# Patient Record
Sex: FEMALE | Race: WHITE | NOT HISPANIC OR LATINO | URBAN - METROPOLITAN AREA
[De-identification: names, ages, dates, MRNs, and addresses within clinical notes are randomized per-mention and may not be internally consistent; named-entity substitution may affect disease eponyms.]

---
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
## 2019-08-15 ENCOUNTER — OFFICE VISIT - RIVER FALLS (OUTPATIENT)
Dept: FAMILY MEDICINE | Facility: CLINIC | Age: 19
End: 2019-08-15

## 2019-08-15 ENCOUNTER — COMMUNICATION - RIVER FALLS (OUTPATIENT)
Dept: FAMILY MEDICINE | Facility: CLINIC | Age: 19
End: 2019-08-15

## 2019-08-15 LAB
CLUE CELLS: PRESENT
TRICHOMONAS, WET PREP: NORMAL
YEAST, WET PREP: NORMAL

## 2019-08-15 ASSESSMENT — MIFFLIN-ST. JEOR: SCORE: 1399.31

## 2019-08-16 ENCOUNTER — COMMUNICATION - RIVER FALLS (OUTPATIENT)
Dept: FAMILY MEDICINE | Facility: CLINIC | Age: 19
End: 2019-08-16

## 2019-08-16 LAB
CHLAMYDIA TRACHOMATIS RNA, TMA - QUEST: NOT DETECTED
NEISSERIA GONORRHOEAE RNA TMA: NOT DETECTED

## 2022-02-11 VITALS
DIASTOLIC BLOOD PRESSURE: 60 MMHG | TEMPERATURE: 97.8 F | WEIGHT: 141.8 LBS | HEART RATE: 72 BPM | BODY MASS INDEX: 23.63 KG/M2 | HEIGHT: 65 IN | SYSTOLIC BLOOD PRESSURE: 128 MMHG

## 2022-02-11 VITALS
WEIGHT: 143.8 LBS | DIASTOLIC BLOOD PRESSURE: 68 MMHG | BODY MASS INDEX: 23.96 KG/M2 | HEIGHT: 65 IN | SYSTOLIC BLOOD PRESSURE: 108 MMHG | HEART RATE: 60 BPM | TEMPERATURE: 98.4 F

## 2022-02-11 VITALS
HEART RATE: 84 BPM | DIASTOLIC BLOOD PRESSURE: 74 MMHG | TEMPERATURE: 97.8 F | WEIGHT: 130.2 LBS | HEIGHT: 66 IN | OXYGEN SATURATION: 98 % | BODY MASS INDEX: 20.93 KG/M2 | SYSTOLIC BLOOD PRESSURE: 106 MMHG

## 2022-02-11 VITALS
DIASTOLIC BLOOD PRESSURE: 70 MMHG | HEART RATE: 88 BPM | TEMPERATURE: 98.2 F | HEIGHT: 66 IN | WEIGHT: 139 LBS | BODY MASS INDEX: 22.34 KG/M2 | OXYGEN SATURATION: 97 % | SYSTOLIC BLOOD PRESSURE: 120 MMHG

## 2022-02-16 NOTE — NURSING NOTE
Depression Screening Entered On:  8/19/2019 10:18 AM CDT    Performed On:  8/15/2019 10:18 AM CDT by Marita Trivedi               Depression Screening   Little Interest - Pleasure in Activities :   Not at all   Feeling Down, Depressed, Hopeless :   Several days   Initial Depression Screen Score :   1    Trouble Falling or Staying Asleep :   Not at all   Feeling Tired or Little Energy :   Several days   Poor Appetite or Overeating :   Several days   Feeling Bad About Yourself :   Several days   Trouble Concentrating :   Not at all   Moving or Speaking Slowly :   Not at all   Thoughts Better Off Dead or Hurting Self :   Not at all   Detailed Depression Screen Score :   3    Total Depression Screen Score :   4    SARA Difficulty with Work, Home, Others :   Not difficult at all   Marita Trivedi - 8/19/2019 10:18 AM CDT

## 2022-02-16 NOTE — TELEPHONE ENCOUNTER
Entered by Yang Aranda CMA on August 20, 2020 10:48:54 AM CDT  ---------------------  From: Yang Aranda CMA   To: [facility]    Sent: 8/20/2020 10:48:54 AM CDT  Subject: Medication Management     ** Submitted: **  Complete:ethinyl estradiol-etonogestrel (ethinyl estradiol-etonogestrel 0.015 mg-0.120 mg/24 hours vaginal ring)   Signed by Yang Aranda CMA  8/20/2020 3:48:00 PM [facility]    ** Approved with modifications: **  ethinyl estradiol-etonogestrel (etonogestrel 0.12 mg-ethinyl estradiol 0.015 mg/24 hr vaginal ring)  insert ONE ring vaginally every FOUR WEEKS; REMOVE AFTER 21 DAYS discard wait ONE WEEK AND place new ring.  Qty:  1 ring        Days Supply:  28        Refills:  0          Substitutions Allowed     Route To Pharmacy - [facility]   Note to Pharmacy:  Pt due for annual exam for further refills  Signed by Yang Aranda CMA            ------------------------------------------  From: [facility]  To: Anne Marie De La Torre MD  Sent: August 20, 2020 7:27:27 AM CDT  Subject: Medication Management  Due: August 12, 2020 4:14:54 PM CDT     ** On Hold Pending Signature **     Dispensed Drug: ethinyl estradiol-etonogestrel (ethinyl estradiol-etonogestrel 0.015 mg-0.120 mg/24 hours vaginal ring), insert ONE ring vaginally every FOUR WEEKS; REMOVE AFTER 21 DAYS discard wait ONE WEEK AND place new ring.  Quantity: 1 ring  Days Supply: 28  Refills: 1  Substitutions Allowed  Notes from Pharmacy:  ------------------------------------------Med Refill      Date of last office visit and reason:  8-15-19 Px      Date of last Med Check / Px:     Date of last labs pertaining to med:      Note:  Rx filled per protocol.  Yang Aranda CMa    RTC order in chart:  yes    For Protocol refill, has patient been contacted:  _

## 2022-02-16 NOTE — TELEPHONE ENCOUNTER
---------------------  From: Ivanna Mustafa LPN (Phone Messages Pool ([id number]_[facility]))   To: [facility] Message Pool ([id number]_[place] [facility] [place]);     Sent: 8/16/2019 8:05:44 AM CDT  Subject: CONSUMER MESSAGE FW: change in prescription           ---------------------  From: CHAPITO SHETTY  To: [facility]  Sent: 08/15/2019 09:50 p.m. CDT  Subject: change in prescription  Hello! Earlier today I received a prescription for metroNIDAZOLE 0.75% vaginal gel to treat a yeast infection, however, I am hoping to find a less expensive treatment option. Initially, I was offered the treatment in pill form and I am wondering if the prescription could be changed to that if it is less expensive? Or are there any other generic forms of the gel?    Thank you for your time!Spoke to pharmacist at 0921. Pill is much less expensive than gel. No other alternatives or generic forms of gel. [facility] for patient at 0922 lettering her know I sent knew script for the pill.

## 2022-02-16 NOTE — NURSING NOTE
Comprehensive Intake Entered On:  8/15/2019 2:56 PM CDT    Performed On:  8/15/2019 2:49 PM CDT by Jemma Mcduffie CMA               Summary   Chief Complaint :   Physical. Possible yeast infection, did use OTC products.    Last Menstrual Period :   8/8/2019 CDT   Weight Measured :   139.0 lb(Converted to: 139 lb 0 oz, 63.05 kg)    Height Measured :   65.5 in(Converted to: 5 ft 5 in, 166.37 cm)    Body Mass Index :   22.78 kg/m2   Body Surface Area :   1.71 m2   Systolic Blood Pressure :   120 mmHg   Diastolic Blood Pressure :   70 mmHg   Mean Arterial Pressure :   87 mmHg   Peripheral Pulse Rate :   88 bpm   BP Site :   Right arm   BP Method :   Manual   HR Method :   Electronic   Temperature Tympanic :   98.2 DegF(Converted to: 36.8 DegC)    Oxygen Saturation :   97 %   Jemma Mcduffie CMA - 8/15/2019 2:49 PM CDT   Health Status   Allergies Verified? :   Yes   Medication History Verified? :   Yes   Pre-Visit Planning Status :   Completed   Tobacco Use? :   Never smoker   Jemma Mcduffie CMA - 8/15/2019 2:49 PM CDT   Consents   Consent for Immunization Exchange :   Consent Granted   Consent for Immunizations to Providers :   Consent Granted   Jemma Mcduffie CMA - 8/15/2019 2:49 PM CDT   Meds / Allergies   (As Of: 8/15/2019 2:56:36 PM CDT)   Allergies (Active)   No Known Medication Allergies  Estimated Onset Date:   Unspecified ; Created By:   Jessica Kauffman CMA; Reaction Status:   Active ; Category:   Drug ; Substance:   No Known Medication Allergies ; Type:   Allergy ; Updated By:   Jessica Kauffman CMA; Reviewed Date:   6/27/2018 12:36 PM CDT        Medication List   (As Of: 8/15/2019 2:56:36 PM CDT)   Prescription/Discharge Order    desogestrel-ethinyl estradiol  :   desogestrel-ethinyl estradiol ; Status:   Prescribed ; Ordered As Mnemonic:   Isibloom 0.15 mg-0.03 mg oral tablet ; Simple Display Line:   1 tab(s), po, daily, 84 tab(s), 3 Refill(s) ; Ordering Provider:   Anne Marie De La Torre MD; Catalog Code:    desogestrel-ethinyl estradiol ; Order Dt/Tm:   6/27/2018 12:53:27 PM

## 2022-02-16 NOTE — PROGRESS NOTES
Chief Complaint    Patient presents for an annual physical exam and vaccines for college.  History of Present Illness      Pt here today for annual exam      she will be starting school this fall and wants to m cole sure her immunizations are up to date.  She does not have any forms with her today.  I was able to find a form for [employer] on line, we reviewed this.  We also reviewed her immunizations from [facility], and pt has required immunizations.      she is on an OCP and tolerating it well, would like this refilled      Review of systems is negative except as per HPI including no fevers, chills, sore throat, runny nose, nausea, vomiting, constipation, diarrhea, rash or new skin lesions, chest pain, palpitations, slurred speech, new paresthesia, shortness of breath or wheeze.             Exam:      see vitals listed below      General: alert and oriented ×3 no acute distress.      HEENT: Normocephalic and atraumatic.       Eyes pupils are equal round and reactive to light extraocular motion is intact. normal conjunctiva      Hearing is grossly normal and there is no otorrhea. Tympanic membranes are pearly grey with a normal light reflex.      Nares are patent there is no rhinorrhea.       Mucous membranes are moist and pink.      Chest: has bilateral rise with no increased work of breathing. clear to auscultation without wheezes, rhonchi, or rales.      Cardiovascular: normal perfusion and brisk capillary refill. S1S2 with regular rate and rhythm and no murmurs, gallops or rubs.      Musculoskeletal: no gross focal abnormalities and normal gait.      Neuro: no gross focal abnormalities and memory seems intact.  CN 2-12 are grossly intact.      Psychiatric: speech is clear and coherent and fluent. Patient dressed appropriately for the weather. Mood is appropriate and affect is full.      Discussed:      using sunscreen, protecting from sunburn,      taking folic acid 400 mcg daily      refer to usdamyhealthyplate.gov, AHA  and ADA for diet and exercise recommendations      consume [id number] mg calcium daily      std screening      regular self skin checks         Physical Exam   Vitals & Measurements    T: 97.8   F (Tympanic)  HR: 84(Peripheral)  BP: 106/74  SpO2: 98%     HT: 65.75 in  WT: 130.2 lb  BMI: 21.17   Assessment/Plan       Screen for STD (sexually transmitted disease) (Z11.3)         Orders:          07911 periodic preventive med est patient 18-39 yrs (Charge), Quantity: 1, Well adult exam  Screen for STD (sexually transmitted disease)          Chlamydia/Neisseria gonorrhoeae RNA, TMA* ([facility]), Specimen Type: Urine, Collection Date: 06/27/18 12:53:00 CDT                Well adult exam (Z00.00)         Orders:          78735 periodic preventive med est patient 18-39 yrs (Charge), Quantity: 1, Well adult exam  Screen for STD (sexually transmitted disease)                Orders:         desogestrel-ethinyl estradiol, 1 tab(s), po, daily, # 84 tab(s), 3 Refill(s), Type: Maintenance, Pharmacy: [facility], Appt due for additional refills. Letter sent, 1 tab(s) po daily, (Ordered)         desogestrel-ethinyl estradiol, 1 tab(s), po, daily, # 28 tab(s), 0 Refill(s), Type: Hard Stop, Pharmacy: [facility], Appt due for additional refills. Letter sent, (Completed)  Patient Information     Name:CHAPITO SHETTY      Address:      [address] [place] [id number]     Sex:Female     YOB: 2000     [phone]     Emergency Contact:MICHELE ALLEN     MRN:[id number]     FIN:[id number]     [facility]     Date of Service:06/27/2018      Primary Care Physician:       NONE ,       Attending Physician:       Anne Marie De La Torre MD, [phone]  Problem List/Past Medical History    Ongoing     No qualifying data    Historical     Distal Radius Fracture, right     No previous hospitalizations  Procedure/Surgical History     No previous  procedures        Medications     Isibloom 0.15 mg-0.03 mg oral tablet: 1 tab(s), po, daily, 84 tab(s), 3 Refill(s).          Allergies    No Known Medication Allergies  Social History    Smoking Status - 06/27/2018     Never smoker  Family History    Breast cancer: Grandmother (M).  Immunizations      Vaccine Date Status Comments      influenza virus vaccine, inactivated 10/05/2017 Given      human papillomavirus vaccine 03/06/2017 Given      human papillomavirus vaccine 09/30/2016 Given [9/30/2016] left lower      meningococcal conjugate vaccine 09/30/2016 Given [9/30/2016] left upper      influenza virus vaccine, inactivated 09/30/2016 Given      rabies 05/17/2016 Given      human papillomavirus vaccine 05/03/2016 Given [5/3/2016] lower      rabies 05/03/2016 Given [5/3/2016] upper      rabies 04/26/2016 Given      Hep A, pediatric/adolescent 02/10/2016 Given      typhoid, inactivated 02/10/2016 Given      yellow fever 02/10/2016 Given      influenza (LAIV) 10/19/2015 Given      influenza (LAIV) 10/14/2014 Given      influenza (LAIV) 10/16/2013 Given      influenza virus vaccine, inactivated 09/26/2012 Given      Hep A, pediatric/adolescent 08/15/2012 Given      meningococcal conjugate vaccine 09/28/2011 Given      tetanus/diphth/pertuss (Tdap) adult/adol 09/28/2011 Given      influenza virus vaccine, inactivated 10/11/2010 Given      influenza 12/18/2009 Recorded      influenza, H1N1, inactivated 12/18/2009 Recorded      influenza virus vaccine, inactivated 11/12/2008 Recorded      varicella 06/26/2008 Recorded      influenza 11/19/2007 Recorded      influenza virus vaccine, inactivated 11/19/2007 Recorded      influenza virus vaccine, inactivated 10/22/2007 Recorded      influenza virus vaccine, inactivated 10/22/2007 Recorded      influenza virus vaccine, inactivated 02/13/2007 Recorded      IPV 12/04/2003 Recorded      MMR (measles/mumps/rubella) 12/04/2003 Recorded      DTaP 12/04/2003 Recorded      Hib  (PRP-T) 02/12/2001 Recorded      IPV 02/12/2001 Recorded      Hep B 02/12/2001 Recorded      DTaP 02/12/2001 Recorded      MMR (measles/mumps/rubella) 10/30/2000 Recorded      varicella 10/30/2000 Recorded      pneumococcal (PCV7) 10/30/2000 Recorded      pneumococcal (PCV7) 08/01/2000 Recorded      Hib (PRP-T) 05/15/2000 Recorded      Hep B 05/15/2000 Recorded      pneumococcal (PCV7) 05/15/2000 Recorded      DTaP 05/15/2000 Recorded      Hib (PRP-T) 03/13/2000 Recorded      IPV 03/13/2000 Recorded      Hep B 03/13/2000 Recorded      DTaP 03/13/2000 Recorded      Hib (PRP-T) 01/04/2000 Recorded      IPV 01/04/2000 Recorded      DTaP 01/04/2000 Recorded

## 2022-02-16 NOTE — LETTER
(Inserted Image. Unable to display)       August 16, 2019      CHAPITO SHETTY  [address], [place] [id number]        Dear CHAPITO,     Thank you for selecting [facility] for your healthcare needs. Below you will find the results of your recent test(s) done at our clinic.     Your results are normal!  Please come back for a follow up appointment if you are still having symptoms.       Result Name Current Result Previous Result Reference Range   Chlam/N. gonorrhea Comments  See comment 8/15/2019  See comment 6/27/2018    Chlamydia RNA  NOT DETECTED 8/15/2019  NOT DETECTED 6/27/2018 NOT DETECTED -    Neisseria gonorrhoeae RNA  NOT DETECTED 8/15/2019  NOT DETECTED 6/27/2018 NOT DETECTED -      Please contact my practice at [phone] if you have any questions or concerns.     Sincerely,        Anne Marie De La Torre MD      What do your labs mean?  Below is a glossary of commonly ordered labs:  LDL   Bad Cholesterol   HDL   Good Cholesterol  AST/ALT   Liver Function   Cr/Creatinine   Kidney Function  Microalbumin   Kidney Function  BUN   Kidney Function  PSA   Prostate    TSH   Thyroid Hormone  HgbA1c   Diabetes Test   Hgb (Hemoglobin)   Red Blood Cells

## 2022-02-16 NOTE — TELEPHONE ENCOUNTER
Entered by Perfecto Jean MD on September 22, 2020 11:05:17 AM CDT  ---------------------  From: Perfecto Jean MD   To: [facility]    Sent: 9/22/2020 11:05:16 AM CDT  Subject: Medication Management     ** Submitted: **  Complete:ethinyl estradiol-etonogestrel (ethinyl estradiol-etonogestrel 0.015 mg-0.120 mg/24 hours vaginal ring)   Signed by Perfecto Jean MD  9/22/2020 4:05:00 PM [facility]    ** Approved **  ethinyl estradiol-etonogestrel (etonogestrel 0.12 mg-ethinyl estradiol 0.015 mg/24 hr vaginal ring)  insert ONE ring vaginally every FOUR WEEKS; REMOVE AFTER 21 DAYS discard wait ONE WEEK AND place new ring.  Qty:  1 ring        Days Supply:  28        Refills:  0          Substitutions Allowed     Route To Pharmacy - [facility]   Note to Pharmacy:  due for visit - left message to notify patient  Signed by Perfecto Jean MD            ------------------------------------------  From: [facility]  To: Anne Marie De La Torre MD  Sent: September 21, 2020 7:14:47 AM CDT  Subject: Medication Management  Due: September 9, 2020 4:21:06 PM CDT     ** On Hold Pending Signature **     Dispensed Drug: ethinyl estradiol-etonogestrel (ethinyl estradiol-etonogestrel 0.015 mg-0.120 mg/24 hours vaginal ring), insert ONE ring vaginally every FOUR WEEKS; REMOVE AFTER 21 DAYS discard wait ONE WEEK AND place new ring.  Quantity: 1 ring  Days Supply: 28  Refills: 0  Substitutions Allowed  Notes from Pharmacy:  ------------------------------------------Patient is due for annual exam. LM on identified VM that she should call clinic to schedule appointment. If unable to be seen in person, can consider video visit.

## 2022-02-16 NOTE — PROGRESS NOTES
Patient:   CHAPITO SHETTY            MRN: [id number]            FIN: [id number]               Age:   17 years     Sex:  Female     :  2000   Associated Diagnoses:   Acute pharyngitis   Author:   Anne Marie De La Torre MD      Visit Information      Date of Service: 2018 10:47 am  Performing Location: [facility]  Encounter#: [id number]      Primary Care Provider (PCP):  [id number] -UNKNOWN,      Referring Provider:  Anne Marie De La Torre MD    NPI# [id number]      Chief Complaint   2018 11:02 AM CST   Patient here for possible mono. Boyfriend has mono. Patient stated she is fatigued and has a sore throat.        History of Present Illness             The patient presents with a sore throat.  The sore throat is described as aching.  The severity of the sore throat is moderate.  The timing/course of the sore throat is constant and remains unchanged.  The sore throat has lasted for 5 day(s).  The context of the sore throat: occurred after infectious exposure and boyfriend recdently  had a sore throat and positive mono test, she does not play contact sports, she has had fatigue for a couple of weeks now, only recenntly developed the sore throat.  Exacerbating factors consist of swallowing.  Relieving factors consist of analgesics.  Associated symptoms consist of chills, fatigue, denies cough, denies difficulty swallowing, denies ear pain, denies fever, denies headache and denies voice change.        Review of Systems   Constitutional:  Negative except as documented in history of present illness.    Eye:  Negative except as documented in history of present illness.    Ear/Nose/Mouth/Throat:  Sore throat.    Respiratory:  Negative except as documented in history of present illness.    Cardiovascular:  Negative except as documented in history of present illness.    Gastrointestinal:  Negative except as documented in history of present illness.    Immunologic:  Negative except as documented in history of  present illness.    Integumentary:  Negative except as documented in history of present illness.              Health Status   Allergies:    Allergic Reactions (Selected)  No known allergies   Problem list:    All Problems  Resolved: Distal Radius Fracture, right  Resolved: No previous hospitalizations / SNOMED CT   Medications:  (Selected)   Prescriptions  Prescribed  Desogen 0.15 mg-0.03 mg oral tablet: 1 tab(s), PO, Daily, # 84 tab(s), 3 Refill(s), Type: Maintenance, Pharmacy: [id number], disregard ortho tricyclen prescription, 1 tab(s) po daily      Histories   Past Medical History:    Resolved  Distal Radius Fracture, right: Onset in 2008 at 8 years.  Resolved.  No previous hospitalizations:  Resolved.   Family History:    Breast cancer  Grandmother (M)     Procedure history:    No previous procedures.   Social History:             No active social history items have been recorded.      Physical Examination   Vital Signs   1/30/2018 11:02 AM CST Temperature Tympanic 97.8 DegF  LOW    Peripheral Pulse Rate 72 bpm    Pulse Site Radial artery    HR Method Manual    Systolic Blood Pressure 128 mmHg    Diastolic Blood Pressure 60 mmHg    Mean Arterial Pressure 83 mmHg    BP Site Right arm    BP Method Manual      Measurements from flowsheet : Measurements   1/30/2018 11:02 AM CST Height Measured - Standard 65 in    Weight Measured - Standard 141.8 lb    BSA 1.72 m2    Body Mass Index 23.59 kg/m2    Body Mass Index Percentile 72.68      General:  Alert and oriented, No acute distress.    Eye:  Normal conjunctiva.    HENT:  Normocephalic, Tympanic membranes are clear, Oral mucosa is moist, tonsils minimally enlarged, not very red, no exudate.    Neck:  Supple, No thyromegaly, Benign reactive lymphadenopathy.    Respiratory:  Lungs are clear to auscultation, Respirations are non-labored, Breath sounds are equal, Symmetrical chest wall expansion.         Pattern: Regular.         Breath sounds: Bilateral,  Within normal limits.    Cardiovascular:  Normal rate, Regular rhythm, No murmur, Good pulses equal in all extremities, Normal peripheral perfusion, No edema.    Gastrointestinal:  Soft, Non-tender, Non-distended, Normal bowel sounds, No organomegaly.    Integumentary:  Warm, Dry, No rash.    Neurologic:  Alert, Oriented.    Psychiatric:  Cooperative, Appropriate mood & affect.       Health Maintenance      Recommendations     Pending (in the next year)        Due            Alcohol Misuse Screen (Female) due  01/30/18  and every 1  year(s)           Chlamydia Screen (if sexually active) due  01/30/18  and every 1  year(s)           Depression Screen (Female) due  01/30/18  and every 1  year(s)           Gonorrhea Screen (if sexually active) due  01/30/18  and every 1  year(s)           HIV Screen (if sexually active) (Female) due  01/30/18  and every 1  year(s)           STD Counseling (if sexually active) (Female) due  01/30/18  and every 1  year(s)           Syphilis Screen (if sexually active) (Female) due  01/30/18  and every 1  year(s)           Well Child 2 yrs - 18 yrs due  01/30/18  and every 1  year(s)     Satisfied (in the past 1 year)        Satisfied            Body Mass Index Check (Female) on  01/30/18.           Body Mass Index Check (Female) on  06/29/17.           High Blood Pressure Screen (Female) on  01/30/18.           High Blood Pressure Screen (Female) on  06/29/17.           Tobacco Use Screen (Female) on  01/30/18.           Tobacco Use Screen (Female) on  06/29/17.          Impression and Plan   Diagnosis     Acute pharyngitis ([id number]).     possible mono, see scanned in note provided to patient to allow her  to miss school as needed for the next two weeks, she will co0nt to not do contact sports.     Plan   Patient Instructions:       Counseled: Patient, Regarding diagnosis, Regarding medications, Activity, Verbalized understanding.    Diagnosis     return to clinic if symptoms  worsen or do not improve.     Plan:  rest, drink plenty of fluids and ok to try tylenol or ibuprofen as dosed on package for fever or pain.  .

## 2022-02-16 NOTE — PROGRESS NOTES
Patient:   CHAPITO SHETTY            MRN: [id number]            FIN: [id number]               Age:   16 years     Sex:  Female     :  2000   Associated Diagnoses:   Acne; Contraceptive management; Well child check   Author:   Georgia Carpio      Chief Complaint   2017 2:01 PM CDT    Patient is here for annual physical.      Well Child History   PPC with mother for annual wellness exam  will be senior this yr, has started to look at colleges but is very stressed trying to choose one  placed on ortho tricyclen 2016 because she was considering accutane but opted not to use accutane, although not sexually active would like to remain on COCs but using it for acne control  is in marching band, wears sunscreen      Review of Systems   Constitutional:  Negative.    Eye:  Negative.    Ear/Nose/Mouth/Throat:  Negative.    Respiratory:  Negative.    Cardiovascular:  Negative.    Breast:  Negative.    Gastrointestinal:  Negative.    Genitourinary:  Negative.    Gynecologic:  Negative.    Hematology/Lymphatics:  Negative.    Endocrine:  Negative.    Immunologic:  Negative.    Musculoskeletal:  Negative.    Integumentary:  Negative except as documented in history of present illness.    Neurologic:  Negative.    Psychiatric:  Negative.             Health Status   Allergies:    Allergic Reactions (Selected)  No known allergies   Medications:  (Selected)   Prescriptions  Prescribed  Ortho Tri-Cyclen oral tablet: 1 tab(s), PO, Daily, # 30 tab(s), 0 Refill(s), Type: Maintenance, Pharmacy: [id number], 1 tab(s) po daily      Histories   Past Medical History:    Resolved  Distal Radius Fracture, right: Onset in  at 8 years.  Resolved.  No previous hospitalizations:  Resolved.   Family History:    Breast cancer  Grandmother (M)     Procedure history:    No previous procedures.      Physical Examination   Last Menstrual Period: 2017   Vital Signs   2017 2:01 PM CDT Temperature Tympanic  98.4 DegF    Peripheral Pulse Rate 60 bpm    Pulse Site Radial artery    HR Method Manual    Systolic Blood Pressure 108 mmHg    Diastolic Blood Pressure 68 mmHg    Mean Arterial Pressure 81 mmHg    BP Site Right arm    BP Method Manual      Measurements from flowsheet : Measurements   6/29/2017 2:01 PM CDT Height Measured - Standard 65 in    Weight Measured - Standard 143.8 lb    BSA 1.73 m2    Body Mass Index 23.93 kg/m2    Body Mass Index Percentile 76.67      General:  Alert and oriented, No acute distress.    Eye:  Pupils are equal, round and reactive to light, Intact accommodation, Normal conjunctiva, Vision unchanged.         Periorbital area: Within normal limits.    HENT:  Normocephalic, Tympanic membranes are clear, Normal hearing, Oral mucosa is moist, No pharyngeal erythema, No sinus tenderness.    Neck:  Supple, Non-tender, No lymphadenopathy, No thyromegaly.    Respiratory:  Lungs are clear to auscultation, Respirations are non-labored, No chest wall tenderness.    Cardiovascular:  Normal rate, Regular rhythm, No murmur, No edema.    Gastrointestinal:  Soft, Non-tender, Non-distended, Normal bowel sounds, No organomegaly.    Genitourinary:  No costovertebral angle tenderness.    Lymphatics:  No lymphadenopathy neck, axilla, groin.    Musculoskeletal:  Normal range of motion, Normal strength, No swelling.    Integumentary:  Warm, Dry, Pink.    Neurologic:  Alert, Oriented.    Psychiatric:  Cooperative, Appropriate mood & affect.       Health Maintenance      Recommendations     Pending (in the next year)        Due            Well Child 2 yrs - 18 yrs due  06/29/17  and every 1  year(s)     Satisfied (in the past 1 year)        Satisfied            Body Mass Index Check (Female) on  06/29/17.        Impression and Plan   Diagnosis     Acne (EMG87-AY L70.9).     Contraceptive management (PCH40-JQ Z30.9).     Well child check (KTX77-KR Z00.129).     Patient Instructions:       Counseled: Patient, Family,  Regarding diagnosis, Regarding treatment, Regarding medications, Activity, Verbalized understanding.    Summary:  non pap needed, encouraged sunscreen use  will change from ortho tricyclen to desogen use to help with acne  discussed personal safety, discussed water safety, discussed avoidance of texting in car  discussed stress.    Orders     Orders (Selected)   Prescriptions  Prescribed  Desogen 0.15 mg-0.03 mg oral tablet: 1 tab(s), PO, Daily, # 84 tab(s), 3 Refill(s), Type: Maintenance, Pharmacy: [id number], disregard ortho tricyclen prescription, 1 tab(s) po daily.     Anticipatory Guidance:       Adolescence (11 - 21 years): Hobbies, Peer relations, School performance, Planning for future, Self image/ dieting, Sexual identity/ dating, Sex education/ STD's, Seatbelts/ airbags, Depression/ anxiety, Discipline/ limits, Nutrition/ oral health ( Avoiding tobacco ).

## 2022-02-16 NOTE — PROGRESS NOTES
Chief Complaint    Physical. Possible yeast infection, did use OTC products.  History of Present Illness      patient present to clinic today for annual wellness exam and to [facility] contraception, does not remember to take the OCP and says without it her acne is more poorly controlled.  She has been using condoms also  for contraception.  She denies possibility of pregnancy and declines UPT today.  Is aware of various alternatives for contraception including nexplanon, OCP, nuvaring, patch, Depo Provera, IUD and IUS, NFP, diaphragm, condoms, abstinence and sterilization. She is aware of risks associated with estrogen including stroke, DVT, PE, CVA, MI and would like to proceed with nuvaring.   Also counseled patient that all patients of reproductive age should be taking 400 mcg folic acid daily to reduce risks of 2 birth defects.      she has had some mild vaginal itching, and is concerned about pain with intercourse during initial penetration.       reports she has sensitive skin that gets dried out easily      Review of systems is negative except as per HPI including:  no fevers, chills, sore throat, runny nose, nausea, vomiting, constipation, diarrhea, rash or new skin lesions, chest pain, palpitations, slurred speech, new paresthesia, shortness of breath or wheeze.      Exam:      General: alert and oriented ×3 no acute distress.      HEENT: pupils are equal round and reactive to light extraocular motion is intact. Normocephalic and atraumatic.       Hearing is grossly normal and there is no otorrhea.       Nares are patent there is no rhinorrhea.       Mucous membranes are moist and pink.      Chest: has bilateral rise with no increased work of breathing.      Cardiovascular: normal perfusion and brisk capillary refill.      Musculoskeletal: no gross focal abnormalities and normal gait.      Neuro: no gross focal abnormalities and memory seems intact.      Psychiatric: speech is clear and coherent and fluent.  Patient dressed appropriately for the weather. Mood is appropriate and affect is full.      skin with some cystic acne on chin but more a component of pustular acne on the chin              : Normal external female genitalia. Vagina has no lesions there is normal physiological secretions present there is no odor no foreign body noticed.       Vulva has no lesions. Mucosa appears normal. Cervix appears normal. Specimens obtained as noted in orders. Clitoral proctor appears normal and there are no labial adhesions.  gc/ct obtained, wet prep obtained, noted to have pain with insertion of speculum, on bimanual exam pt has involuntary tightening of the muscles at her introitus,                      Discussed with patient to return to clinic if symptoms worsen or do not improve, use condoms for back up for the first month to reduce risk of pregnancy and always use condoms to reduce risk of STD transmission.  Use sunscreen to reduce risk of skin cancer, refer to my healthy plate for information regarding diet and American Heart association website for exercise recommendations.   Physical Exam   Vitals & Measurements    T: 98.2   F (Tympanic)  HR: 88(Peripheral)  BP: 120/70  SpO2: 97%     HT: 65.5 in  WT: 139.0 lb  BMI: 22.78   Assessment/Plan       Acne vulgaris (L70.0)         Ordered:          benzoyl peroxide topical, See Instructions, Instructions: apply thin layer to face bid, # 1 EA, 11 Refill(s), Type: Maintenance, Pharmacy: [id number], apply thin layer to face bid, (Ordered)          benzoyl peroxide topical, See Instructions, Instructions: apply thin layer to face bid, # 1 EA, 11 Refill(s), Type: Hard Stop, (Completed)          clindamycin topical, 1 zhou, Topical, bid, # 3 EA, 3 Refill(s), Type: Maintenance, Pharmacy: [facility] #[id number], 1 zhou Topical bid, (Ordered)          clindamycin topical, 1 zhou, Topical, bid, # 3 EA, 3 Refill(s), Type: Hard Stop, (Completed)          clindamycin  topical, 1 zhou, Topical, bid, # 60 mL, 0 Refill(s), Type: Hard Stop, (Completed)                Encounter for well adult exam with abnormal findings (Z00.01)         Ordered:          ethinyl estradiol-etonogestrel, 1 EA, VAG, q4 wks, Instructions: remove after 21 days and discard, wait 1 week and place new ring, # 3 EA, 3 Refill(s), Type: Maintenance, 1 EA VAG q4 wks,Instr:remove after 21 days and discard, wait 1 week and place new ring, (Ordered)          29012 periodic preventive med est patient 18-39 yrs (Charge), Quantity: 1, Encounter for well adult exam with abnormal findings                Vaginal itching (N89.8)         Ordered:          Wet Prep Vaginal (Request), Priority: Urgent, Vaginal itching                Vaginismus (N94.2)         Ordered:          Physical Therapy (Request), Vaginismus                Orders:         desogestrel-ethinyl estradiol, 1 tab(s), po, daily, # 84 tab(s), 3 Refill(s), Type: Maintenance, Pharmacy: [id number], Appt due for additional refills. Letter sent, 1 tab(s) po daily, (Completed)         metroNIDAZOLE topical, 1 zhou, VAG, bid, # 70 gm, 0 Refill(s), Type: Soft Stop, Pharmacy: [facility] #[id number], 1 zhou VAG bid,x5 day(s), (Ordered)         Chlamydia/Neisseria gonorrhoeae RNA, TMA* ([facility]), Specimen Type: Swab, Collection Date: 08/15/19 15:54:00 CDT  Patient Information     Name:CHAPITO SHETTY      Address:      [address] [place] [id number]     Sex:Female     YOB: 2000     [phone]     Emergency Contact:MICHELE ALLEN     MRN:[id number]     FIN:[id number]     Location:[facility]     Date of Service:08/15/2019      Primary Care Physician:       NONE ,       Attending Physician:       Wil BLANCO, Anne Marie, [phone]  Problem List/Past Medical History    Ongoing     No qualifying data    Historical     Distal Radius Fracture, right     No previous hospitalizations   acne  vulgaris  Procedure/Surgical History     No previous procedures        Medications    benzoyl peroxide 2.5% topical cream, See Instructions, 11 refills    clindamycin 1% topical lotion, 1 zhou, Topical, bid, 3 refills    metroNIDAZOLE 0.75% vaginal gel with applicator, 1 zhou, VAG, bid    NuvaRing 0.120 mg-0.015 mg/24 hours vaginal ring, 1 EA, VAG, q4 wks, 3 refills  Allergies    No Known Medication Allergies  Social History    Smoking Status - 08/15/2019     Never smoker     Alcohol      Never, 06/29/2018  Family History    Arthritis: Mother.    Breast cancer: Grandmother (M).    Hypertension: Father.  Immunizations      Vaccine Date Status Comments      influenza virus vaccine, inactivated 10/05/2017 Given      human papillomavirus vaccine 03/06/2017 Given      human papillomavirus vaccine 09/30/2016 Given [9/30/2016] left lower      meningococcal conjugate vaccine 09/30/2016 Given [9/30/2016] left upper      influenza virus vaccine, inactivated 09/30/2016 Given      rabies 05/17/2016 Given      rabies 05/03/2016 Given [5/3/2016] upper      human papillomavirus vaccine 05/03/2016 Given [5/3/2016] lower      rabies 04/26/2016 Given      typhoid, inactivated 02/10/2016 Given      Hep A, pediatric/adolescent 02/10/2016 Given      yellow fever 02/10/2016 Given      influenza (LAIV) 10/19/2015 Given      influenza (LAIV) 10/14/2014 Given      influenza (LAIV) 10/16/2013 Given      influenza virus vaccine, inactivated 09/26/2012 Given      Hep A, pediatric/adolescent 08/15/2012 Given      meningococcal conjugate vaccine 09/28/2011 Given      tetanus/diphth/pertuss (Tdap) adult/adol 09/28/2011 Given      influenza virus vaccine, inactivated 10/11/2010 Given      influenza, H1N1, inactivated 12/18/2009 Recorded      influenza 12/18/2009 Recorded      influenza virus vaccine, inactivated 11/12/2008 Recorded      varicella 06/26/2008 Recorded      influenza virus vaccine, inactivated 11/19/2007 Recorded      influenza  11/19/2007 Recorded      influenza virus vaccine, inactivated 10/22/2007 Recorded      influenza virus vaccine, inactivated 10/22/2007 Recorded      influenza virus vaccine, inactivated 02/13/2007 Recorded      DTaP 12/04/2003 Recorded      MMR (measles/mumps/rubella) 12/04/2003 Recorded      IPV 12/04/2003 Recorded      DTaP 02/12/2001 Recorded      Hep B 02/12/2001 Recorded      Hib (PRP-T) 02/12/2001 Recorded      IPV 02/12/2001 Recorded      varicella 10/30/2000 Recorded      pneumococcal (PCV7) 10/30/2000 Recorded      MMR (measles/mumps/rubella) 10/30/2000 Recorded      pneumococcal (PCV7) 08/01/2000 Recorded      DTaP 05/15/2000 Recorded      pneumococcal (PCV7) 05/15/2000 Recorded      Hep B 05/15/2000 Recorded      Hib (PRP-T) 05/15/2000 Recorded      DTaP 03/13/2000 Recorded      Hep B 03/13/2000 Recorded      Hib (PRP-T) 03/13/2000 Recorded      IPV 03/13/2000 Recorded      DTaP 01/04/2000 Recorded      Hib (PRP-T) 01/04/2000 Recorded      IPV 01/04/2000 Recorded  Lab Results       Lab Results (Last 4 results within 90 days)        Wet Prep Yeast: None Seen (08/15/19 16:00:00)       Wet Prep Trichomonas: None Seen (08/15/19 16:00:00)       Wet Prep Clue Cells: Present (08/15/19 16:00:00)

## 2022-02-16 NOTE — TELEPHONE ENCOUNTER
Entered by Le Cat MA on July 07, 2020 11:02:59 AM CDT  ---------------------  From: Le Cat MA   To: [facility]    Sent: 7/7/2020 11:02:59 AM CDT  Subject: Medication Management     ** Submitted: **  Order:ethinyl estradiol-etonogestrel (ethinyl estradiol-etonogestrel 0.015 mg-0.120 mg/24 hours vaginal ring)  See Instructions  insert ONE ring vaginally every FOUR WEEKS; REMOVE AFTER 21 DAYS discard wait ONE WEEK AND place new ring.  Qty:  1 EA        Refills:  1          Substitutions Allowed     Route To Pharmacy - [facility]    Signed by Le Cat MA  7/7/2020 4:02:00 PM [facility]    ** Submitted: **  Complete:ethinyl estradiol-etonogestrel (NuvaRing 0.120 mg-0.015 mg/24 hours vaginal ring)   Signed by Le Cat MA  7/7/2020 4:02:00 PM [facility]    ** Not Approved:  **  ethinyl estradiol-etonogestrel (etonogestrel 0.12 mg-ethinyl estradiol 0.015 mg/24 hr vaginal ring)  insert ONE ring vaginally every FOUR WEEKS; REMOVE AFTER 21 DAYS discard wait ONE WEEK AND place new ring.  Qty:  11 ring        Days Supply:  28        Refills:  0          Substitutions Allowed     Route To Pharmacy - [facility]   Signed by Le Cat MA            ** Patient matched by Le Cat MA on 7/7/2020 11:00:44 AM CDT **      ------------------------------------------  From: [facility]  To: Anne Marie De La Torre MD  Sent: July 6, 2020 7:23:35 AM CDT  Subject: Medication Management  Due: June 24, 2020 10:20:04 PM CDT     ** On Hold Pending Signature **     Drug: ethinyl estradiol-etonogestrel (ethinyl estradiol-etonogestrel 0.015 mg-0.120 mg/24 hours vaginal ring), insert ONE ring vaginally every FOUR WEEKS; REMOVE AFTER 21 DAYS discard wait ONE WEEK AND place new ring.  Quantity: 11 ring  Days Supply: 28  Refills: 0  Substitutions Allowed  Notes from Pharmacy:     Dispensed Drug: ethinyl estradiol-etonogestrel (ethinyl estradiol-etonogestrel 0.015 mg-0.120 mg/24  hours vaginal ring), insert ONE ring vaginally every FOUR WEEKS; REMOVE AFTER 21 DAYS discard wait ONE WEEK AND place new ring.  Quantity: 11 ring  Days Supply: 28  Refills: 0  Substitutions Allowed  Notes from Pharmacy:  ------------------------------------------Med Refill      Date of last office visit and reason:  8/15/19 anamaria/ JOSE LUIS for px      Date of last Med Check / Px:   _  Date of last labs pertaining to med:  _    Note:  _    RTC order in chart:  RTC due Aug 2020    For Protocol refill, has patient been contacted:  message sent to pharmacy

## 2022-02-16 NOTE — NURSING NOTE
CAGE Assessment Entered On:  8/19/2019 10:18 AM CDT    Performed On:  8/15/2019 10:18 AM CDT by Marita Trivedi               Assessment   Have you ever felt you should cut down on your drinking :   No   Have people annoyed you by criticizing your drinking :   No   Have you ever felt bad or guilty about your drinking :   No   Have you ever taken a drink first thing in the morning to steady your nerves or get rid of a hangover (Eye-opener) :   No   CAGE Score :   0    Marita Trivedi - 8/19/2019 10:18 AM CDT